# Patient Record
Sex: MALE | Race: WHITE | NOT HISPANIC OR LATINO | Employment: FULL TIME | URBAN - METROPOLITAN AREA
[De-identification: names, ages, dates, MRNs, and addresses within clinical notes are randomized per-mention and may not be internally consistent; named-entity substitution may affect disease eponyms.]

---

## 2017-04-30 ENCOUNTER — OFFICE VISIT (OUTPATIENT)
Dept: URGENT CARE | Age: 27
End: 2017-04-30
Payer: COMMERCIAL

## 2017-04-30 PROCEDURE — G0382 LEV 3 HOSP TYPE B ED VISIT: HCPCS | Performed by: FAMILY MEDICINE

## 2017-06-11 ENCOUNTER — OFFICE VISIT (OUTPATIENT)
Dept: URGENT CARE | Facility: MEDICAL CENTER | Age: 27
End: 2017-06-11
Payer: COMMERCIAL

## 2017-06-11 PROCEDURE — G0382 LEV 3 HOSP TYPE B ED VISIT: HCPCS

## 2017-07-14 ENCOUNTER — OFFICE VISIT (OUTPATIENT)
Dept: URGENT CARE | Facility: MEDICAL CENTER | Age: 27
End: 2017-07-14
Payer: COMMERCIAL

## 2017-07-14 PROCEDURE — G0383 LEV 4 HOSP TYPE B ED VISIT: HCPCS

## 2018-07-16 ENCOUNTER — TRANSCRIBE ORDERS (OUTPATIENT)
Dept: ADMINISTRATIVE | Facility: HOSPITAL | Age: 28
End: 2018-07-16

## 2018-07-16 DIAGNOSIS — R10.12 ABDOMINAL PAIN, LEFT UPPER QUADRANT: ICD-10-CM

## 2018-07-16 DIAGNOSIS — K21.9 GASTROESOPHAGEAL REFLUX DISEASE WITHOUT ESOPHAGITIS: Primary | ICD-10-CM

## 2018-07-18 DIAGNOSIS — K21.9 GASTROESOPHAGEAL REFLUX DISEASE WITHOUT ESOPHAGITIS: Primary | ICD-10-CM

## 2018-07-18 DIAGNOSIS — F90.9 ATTENTION DEFICIT HYPERACTIVITY DISORDER (ADHD), UNSPECIFIED ADHD TYPE: ICD-10-CM

## 2018-07-18 RX ORDER — DEXTROAMPHETAMINE SACCHARATE, AMPHETAMINE ASPARTATE MONOHYDRATE, DEXTROAMPHETAMINE SULFATE AND AMPHETAMINE SULFATE 5; 5; 5; 5 MG/1; MG/1; MG/1; MG/1
CAPSULE, EXTENDED RELEASE ORAL EVERY 24 HOURS
COMMUNITY
Start: 2018-06-12 | End: 2018-07-18 | Stop reason: SDUPTHER

## 2018-07-18 RX ORDER — PANTOPRAZOLE SODIUM 40 MG/1
TABLET, DELAYED RELEASE ORAL EVERY 12 HOURS
COMMUNITY
Start: 2018-05-04 | End: 2018-07-18 | Stop reason: SDUPTHER

## 2018-07-22 RX ORDER — PANTOPRAZOLE SODIUM 40 MG/1
40 TABLET, DELAYED RELEASE ORAL 2 TIMES DAILY
Qty: 60 TABLET | Refills: 1 | Status: SHIPPED | OUTPATIENT
Start: 2018-07-22

## 2018-07-22 RX ORDER — DEXTROAMPHETAMINE SACCHARATE, AMPHETAMINE ASPARTATE MONOHYDRATE, DEXTROAMPHETAMINE SULFATE AND AMPHETAMINE SULFATE 5; 5; 5; 5 MG/1; MG/1; MG/1; MG/1
20 CAPSULE, EXTENDED RELEASE ORAL EVERY 24 HOURS
Qty: 30 CAPSULE | Refills: 0 | Status: SHIPPED | OUTPATIENT
Start: 2018-07-22 | End: 2018-09-12 | Stop reason: SDUPTHER

## 2018-08-03 ENCOUNTER — HOSPITAL ENCOUNTER (OUTPATIENT)
Dept: GASTROENTEROLOGY | Facility: HOSPITAL | Age: 28
Discharge: HOME | End: 2018-08-03
Attending: INTERNAL MEDICINE
Payer: COMMERCIAL

## 2018-08-03 VITALS
SYSTOLIC BLOOD PRESSURE: 102 MMHG | HEIGHT: 70 IN | RESPIRATION RATE: 18 BRPM | HEART RATE: 51 BPM | WEIGHT: 190 LBS | DIASTOLIC BLOOD PRESSURE: 58 MMHG | BODY MASS INDEX: 27.2 KG/M2

## 2018-08-03 DIAGNOSIS — R10.12 ABDOMINAL PAIN, LEFT UPPER QUADRANT: ICD-10-CM

## 2018-08-03 DIAGNOSIS — K21.9 GASTROESOPHAGEAL REFLUX DISEASE WITHOUT ESOPHAGITIS: ICD-10-CM

## 2018-08-03 PROCEDURE — 91020 GASTRIC MOTILITY STUDIES: CPT

## 2018-08-03 NOTE — PERIOPERATIVE NURSING NOTE
Manometry catheter passed through right side  Pt tolerated 10 liquid and 10 viscous swallows  Catheter removed without difficulty  Pt left in NAD

## 2018-08-06 PROCEDURE — 91010 ESOPHAGUS MOTILITY STUDY: CPT | Performed by: INTERNAL MEDICINE

## 2018-09-12 DIAGNOSIS — F90.9 ATTENTION DEFICIT HYPERACTIVITY DISORDER (ADHD), UNSPECIFIED ADHD TYPE: ICD-10-CM

## 2018-09-12 RX ORDER — DEXTROAMPHETAMINE SACCHARATE, AMPHETAMINE ASPARTATE MONOHYDRATE, DEXTROAMPHETAMINE SULFATE AND AMPHETAMINE SULFATE 5; 5; 5; 5 MG/1; MG/1; MG/1; MG/1
20 CAPSULE, EXTENDED RELEASE ORAL EVERY 24 HOURS
Qty: 30 CAPSULE | Refills: 0 | Status: SHIPPED | OUTPATIENT
Start: 2018-09-12 | End: 2018-10-19 | Stop reason: SDUPTHER

## 2018-09-20 ENCOUNTER — TELEPHONE (OUTPATIENT)
Dept: FAMILY MEDICINE CLINIC | Facility: CLINIC | Age: 28
End: 2018-09-20

## 2018-10-19 ENCOUNTER — TELEPHONE (OUTPATIENT)
Dept: FAMILY MEDICINE CLINIC | Facility: CLINIC | Age: 28
End: 2018-10-19

## 2018-10-19 DIAGNOSIS — F90.9 ATTENTION DEFICIT HYPERACTIVITY DISORDER (ADHD), UNSPECIFIED ADHD TYPE: ICD-10-CM

## 2018-10-19 RX ORDER — DEXTROAMPHETAMINE SACCHARATE, AMPHETAMINE ASPARTATE MONOHYDRATE, DEXTROAMPHETAMINE SULFATE AND AMPHETAMINE SULFATE 5; 5; 5; 5 MG/1; MG/1; MG/1; MG/1
20 CAPSULE, EXTENDED RELEASE ORAL EVERY 24 HOURS
Qty: 30 CAPSULE | Refills: 0 | Status: SHIPPED | OUTPATIENT
Start: 2018-10-19 | End: 2018-11-23 | Stop reason: SDUPTHER

## 2018-11-23 ENCOUNTER — TELEPHONE (OUTPATIENT)
Dept: FAMILY MEDICINE CLINIC | Facility: CLINIC | Age: 28
End: 2018-11-23

## 2018-11-23 DIAGNOSIS — F90.9 ATTENTION DEFICIT HYPERACTIVITY DISORDER (ADHD), UNSPECIFIED ADHD TYPE: ICD-10-CM

## 2018-11-23 RX ORDER — DEXTROAMPHETAMINE SACCHARATE, AMPHETAMINE ASPARTATE MONOHYDRATE, DEXTROAMPHETAMINE SULFATE AND AMPHETAMINE SULFATE 5; 5; 5; 5 MG/1; MG/1; MG/1; MG/1
20 CAPSULE, EXTENDED RELEASE ORAL EVERY 24 HOURS
Qty: 30 CAPSULE | Refills: 0 | Status: SHIPPED | OUTPATIENT
Start: 2018-11-23 | End: 2019-03-05 | Stop reason: SDUPTHER

## 2019-01-13 ENCOUNTER — APPOINTMENT (EMERGENCY)
Dept: RADIOLOGY | Facility: HOSPITAL | Age: 29
End: 2019-01-13
Payer: COMMERCIAL

## 2019-01-13 ENCOUNTER — HOSPITAL ENCOUNTER (EMERGENCY)
Facility: HOSPITAL | Age: 29
Discharge: HOME | End: 2019-01-13
Attending: EMERGENCY MEDICINE | Admitting: EMERGENCY MEDICINE
Payer: COMMERCIAL

## 2019-01-13 VITALS
SYSTOLIC BLOOD PRESSURE: 139 MMHG | BODY MASS INDEX: 29.58 KG/M2 | HEART RATE: 72 BPM | RESPIRATION RATE: 18 BRPM | OXYGEN SATURATION: 98 % | WEIGHT: 206.13 LBS | DIASTOLIC BLOOD PRESSURE: 75 MMHG

## 2019-01-13 DIAGNOSIS — R07.9 CHEST PAIN: Primary | ICD-10-CM

## 2019-01-13 LAB
AMPHETAMINES SERPL QL SCN: NEGATIVE
ANION GAP SERPL CALCULATED.3IONS-SCNC: 4 MMOL/L (ref 4–13)
APAP SERPL-MCNC: <2 UG/ML (ref 10–30)
APTT PPP: 32 SECONDS (ref 26–38)
ATRIAL RATE: 56 BPM
BARBITURATES UR QL: NEGATIVE
BASOPHILS # BLD AUTO: 0.04 THOUSANDS/ΜL (ref 0–0.1)
BASOPHILS NFR BLD AUTO: 1 % (ref 0–1)
BENZODIAZ UR QL: NEGATIVE
BUN SERPL-MCNC: 12 MG/DL (ref 5–25)
CALCIUM SERPL-MCNC: 9 MG/DL (ref 8.3–10.1)
CHLORIDE SERPL-SCNC: 105 MMOL/L (ref 100–108)
CO2 SERPL-SCNC: 32 MMOL/L (ref 21–32)
COCAINE UR QL: NEGATIVE
CREAT SERPL-MCNC: 0.95 MG/DL (ref 0.6–1.3)
DEPRECATED D DIMER PPP: <270 NG/ML (FEU)
EOSINOPHIL # BLD AUTO: 0.07 THOUSAND/ΜL (ref 0–0.61)
EOSINOPHIL NFR BLD AUTO: 1 % (ref 0–6)
ERYTHROCYTE [DISTWIDTH] IN BLOOD BY AUTOMATED COUNT: 11.7 % (ref 11.6–15.1)
ETHANOL SERPL-MCNC: <3 MG/DL (ref 0–3)
GFR SERPL CREATININE-BSD FRML MDRD: 106 ML/MIN/1.73SQ M
GLUCOSE SERPL-MCNC: 96 MG/DL (ref 65–140)
HCT VFR BLD AUTO: 44.8 % (ref 36.5–49.3)
HGB BLD-MCNC: 15.2 G/DL (ref 12–17)
IMM GRANULOCYTES # BLD AUTO: 0.01 THOUSAND/UL (ref 0–0.2)
IMM GRANULOCYTES NFR BLD AUTO: 0 % (ref 0–2)
INR PPP: 1.08 (ref 0.86–1.17)
LYMPHOCYTES # BLD AUTO: 1.13 THOUSANDS/ΜL (ref 0.6–4.47)
LYMPHOCYTES NFR BLD AUTO: 19 % (ref 14–44)
MAGNESIUM SERPL-MCNC: 1.8 MG/DL (ref 1.6–2.6)
MCH RBC QN AUTO: 30.5 PG (ref 26.8–34.3)
MCHC RBC AUTO-ENTMCNC: 33.9 G/DL (ref 31.4–37.4)
MCV RBC AUTO: 90 FL (ref 82–98)
METHADONE UR QL: NEGATIVE
MONOCYTES # BLD AUTO: 0.42 THOUSAND/ΜL (ref 0.17–1.22)
MONOCYTES NFR BLD AUTO: 7 % (ref 4–12)
NEUTROPHILS # BLD AUTO: 4.32 THOUSANDS/ΜL (ref 1.85–7.62)
NEUTS SEG NFR BLD AUTO: 72 % (ref 43–75)
NRBC BLD AUTO-RTO: 0 /100 WBCS
OPIATES UR QL SCN: NEGATIVE
P AXIS: 81 DEGREES
PCP UR QL: NEGATIVE
PLATELET # BLD AUTO: 240 THOUSANDS/UL (ref 149–390)
PMV BLD AUTO: 12.1 FL (ref 8.9–12.7)
POTASSIUM SERPL-SCNC: 3.9 MMOL/L (ref 3.5–5.3)
PR INTERVAL: 160 MS
PROTHROMBIN TIME: 13.7 SECONDS (ref 11.8–14.2)
QRS AXIS: 77 DEGREES
QRSD INTERVAL: 78 MS
QT INTERVAL: 402 MS
QTC INTERVAL: 387 MS
RBC # BLD AUTO: 4.99 MILLION/UL (ref 3.88–5.62)
SALICYLATES SERPL-MCNC: <3 MG/DL (ref 3–20)
SODIUM SERPL-SCNC: 141 MMOL/L (ref 136–145)
T WAVE AXIS: 61 DEGREES
THC UR QL: NEGATIVE
TROPONIN I SERPL-MCNC: <0.02 NG/ML
TSH SERPL DL<=0.05 MIU/L-ACNC: 1.28 UIU/ML (ref 0.36–3.74)
VENTRICULAR RATE: 56 BPM
WBC # BLD AUTO: 5.99 THOUSAND/UL (ref 4.31–10.16)

## 2019-01-13 PROCEDURE — 83735 ASSAY OF MAGNESIUM: CPT | Performed by: PHYSICIAN ASSISTANT

## 2019-01-13 PROCEDURE — 99285 EMERGENCY DEPT VISIT HI MDM: CPT

## 2019-01-13 PROCEDURE — 85610 PROTHROMBIN TIME: CPT | Performed by: PHYSICIAN ASSISTANT

## 2019-01-13 PROCEDURE — 85025 COMPLETE CBC W/AUTO DIFF WBC: CPT | Performed by: PHYSICIAN ASSISTANT

## 2019-01-13 PROCEDURE — 80307 DRUG TEST PRSMV CHEM ANLYZR: CPT | Performed by: PHYSICIAN ASSISTANT

## 2019-01-13 PROCEDURE — 80329 ANALGESICS NON-OPIOID 1 OR 2: CPT | Performed by: PHYSICIAN ASSISTANT

## 2019-01-13 PROCEDURE — 85379 FIBRIN DEGRADATION QUANT: CPT | Performed by: PHYSICIAN ASSISTANT

## 2019-01-13 PROCEDURE — 93010 ELECTROCARDIOGRAM REPORT: CPT | Performed by: INTERNAL MEDICINE

## 2019-01-13 PROCEDURE — 80320 DRUG SCREEN QUANTALCOHOLS: CPT | Performed by: PHYSICIAN ASSISTANT

## 2019-01-13 PROCEDURE — 84484 ASSAY OF TROPONIN QUANT: CPT | Performed by: PHYSICIAN ASSISTANT

## 2019-01-13 PROCEDURE — 85730 THROMBOPLASTIN TIME PARTIAL: CPT | Performed by: PHYSICIAN ASSISTANT

## 2019-01-13 PROCEDURE — 80048 BASIC METABOLIC PNL TOTAL CA: CPT | Performed by: PHYSICIAN ASSISTANT

## 2019-01-13 PROCEDURE — 36415 COLL VENOUS BLD VENIPUNCTURE: CPT | Performed by: PHYSICIAN ASSISTANT

## 2019-01-13 PROCEDURE — 71046 X-RAY EXAM CHEST 2 VIEWS: CPT

## 2019-01-13 PROCEDURE — 84443 ASSAY THYROID STIM HORMONE: CPT | Performed by: PHYSICIAN ASSISTANT

## 2019-01-13 PROCEDURE — 93005 ELECTROCARDIOGRAM TRACING: CPT

## 2019-01-13 NOTE — DISCHARGE INSTRUCTIONS
Chest Pain   WHAT YOU NEED TO KNOW:   Chest pain can be caused by a range of conditions, from not serious to life-threatening  Chest pain can be a symptom of a digestive problem, such as acid reflux or a stomach ulcer  An anxiety attack or a strong emotion, such as anger, can also cause chest pain  Infection, inflammation, or a fracture in the bones or cartilage in your chest can cause pain or discomfort  Sometimes chest pain or pressure is caused by poor blood flow to your heart (angina)  Chest pain may also be caused by life-threatening conditions such as a heart attack or blood clot in your lungs  DISCHARGE INSTRUCTIONS:   Call 911 if:   · You have any of the following signs of a heart attack:      ¨ Squeezing, pressure, or pain in your chest that lasts longer than 5 minutes or returns    ¨ Discomfort or pain in your back, neck, jaw, stomach, or arm     ¨ Trouble breathing    ¨ Nausea or vomiting    ¨ Lightheadedness or a sudden cold sweat, especially with chest pain or trouble breathing    Return to the emergency department if:   · You have chest discomfort that gets worse, even with medicine  · You cough or vomit blood  · Your bowel movements are black or bloody  · You cannot stop vomiting, or it hurts to swallow  Contact your healthcare provider if:   · You have questions or concerns about your condition or care  Medicines:   · Medicines  may be given to treat the cause of your chest pain  Examples include pain medicine, anxiety medicine, or medicines to increase blood flow to your heart  · Do not take certain medicines without asking your healthcare provider first   These include NSAIDs, herbal or vitamin supplements, or hormones (estrogen or progestin)  · Take your medicine as directed  Contact your healthcare provider if you think your medicine is not helping or if you have side effects  Tell him or her if you are allergic to any medicine   Keep a list of the medicines, vitamins, and herbs you take  Include the amounts, and when and why you take them  Bring the list or the pill bottles to follow-up visits  Carry your medicine list with you in case of an emergency  Follow up with your healthcare provider within 72 hours, or as directed: You may need to return for more tests to find the cause of your chest pain  You may be referred to a specialist, such as a cardiologist or gastroenterologist  Write down your questions so you remember to ask them during your visits  Healthy living tips: The following are general healthy guidelines  If your chest pain is caused by a heart problem, your healthcare provider will give you specific guidelines to follow  · Do not smoke  Nicotine and other chemicals in cigarettes and cigars can cause lung and heart damage  Ask your healthcare provider for information if you currently smoke and need help to quit  E-cigarettes or smokeless tobacco still contain nicotine  Talk to your healthcare provider before you use these products  · Eat a variety of healthy, low-fat foods  Healthy foods include fruits, vegetables, whole-grain breads, low-fat dairy products, beans, lean meats, and fish  Ask for more information about a heart healthy diet  · Ask about activity  Your healthcare provider will tell you which activities to limit or avoid  Ask when you can drive, return to work, and have sex  Ask about the best exercise plan for you  · Maintain a healthy weight  Ask your healthcare provider how much you should weigh  Ask him or her to help you create a weight loss plan if you are overweight  · Get the flu and pneumonia vaccines  All adults should get the influenza (flu) vaccine  Get it every year as soon as it becomes available  The pneumococcal vaccine is given to adults aged 72 years or older  The vaccine is given every 5 years to prevent pneumococcal disease, such as pneumonia    © 2017 [employer] [employer] Information is for End User's use only and may not be sold, redistributed or otherwise used for commercial purposes  All illustrations and images included in CareNotes® are the copyrighted property of A D A M , Inc  or Malik Linda  The above information is an  only  It is not intended as medical advice for individual conditions or treatments  Talk to your doctor, nurse or pharmacist before following any medical regimen to see if it is safe and effective for you

## 2019-01-13 NOTE — ED PROVIDER NOTES
History  Chief Complaint   Patient presents with    Chest Pain     pt c/o chest pain and SOB at times  denies SOB now, is having sharp stabbing pain  Patient presents to emergency room complaining of a 1 week history of constant chest pressure  He states that he believes maybe it says secondary to  Lifting weights  He has had stress related to people stocking him from  [facility]  He is very anxious upon presentation  States the pain is worse with a deep breath  He complains of associated palpitations  He states he has recently had an upper respiratory infection that resolved  He states that he was at [facility] 2 days ago and had a full workup  There is nothing listed within the chart  CT head and EKG at that time that was normal   He denies any street drug use  He is not a smoker  He is not use alcohol products  He has rambling speech and does on and on and on about the stocking  He is quite anxious  He states that he has been drinking a lot of caffeinated sodas  He denies any cardiac history  Negative family history          History provided by:  Patient  Chest Pain   Pain location:  Substernal area  Pain radiates to:  Does not radiate  Pain radiates to the back: no    Pain severity:  Mild  Onset quality:  Gradual  Duration:  1 week  Timing:  Constant  Progression:  Unchanged  Chronicity:  New  Context: breathing, lifting, at rest and stress    Context: no drug use, not eating, no intercourse, no movement, not raising an arm and no trauma    Relieved by:  Nothing  Worsened by:  Nothing tried  Ineffective treatments:  None tried  Associated symptoms: anxiety and palpitations    Associated symptoms: no abdominal pain, no AICD problem, no altered mental status, no anorexia, no back pain, no claudication, no cough, no diaphoresis, no dizziness, no dysphagia, no fatigue, no fever, no headache, no heartburn, no lower extremity edema, no nausea, no near-syncope, no numbness, no orthopnea, no PND, no shortness of breath, not vomiting and no weakness    Risk factors: male sex    Risk factors: no aortic disease, no birth control, no coronary artery disease, no diabetes mellitus, no Josesito-Danlos syndrome, no high cholesterol, no hypertension, no immobilization, no Marfan's syndrome, not obese, not pregnant, no prior DVT/PE, no smoking and no surgery        Prior to Admission Medications   Prescriptions Last Dose Informant Patient Reported? Taking? amphetamine-dextroamphetamine (ADDERALL XR, 20MG,) 20 MG 24 hr capsule Not Taking at Unknown time  No No   Sig: Take 1 capsule (20 mg total) by mouth every 24 hours Max Daily Amount: 20 mg   Patient not taking: Reported on 1/13/2019    pantoprazole (PROTONIX) 40 mg tablet Not Taking at Unknown time  No No   Sig: Take 1 tablet (40 mg total) by mouth 2 (two) times a day   Patient not taking: Reported on 1/13/2019       Facility-Administered Medications: None       Past Medical History:   Diagnosis Date    ADHD     GERD (gastroesophageal reflux disease)        History reviewed  No pertinent surgical history  History reviewed  No pertinent family history  I have reviewed and agree with the history as documented  Social History   Substance Use Topics    Smoking status: Never Smoker    Smokeless tobacco: Never Used    Alcohol use No        Review of Systems   Constitutional: Positive for activity change  Negative for appetite change, chills, diaphoresis, fatigue and fever  HENT: Negative for congestion, dental problem, ear discharge, ear pain, postnasal drip, rhinorrhea, sore throat and trouble swallowing  Eyes: Negative for pain, discharge, redness and itching  Respiratory: Positive for chest tightness  Negative for cough and shortness of breath  Cardiovascular: Positive for chest pain and palpitations  Negative for orthopnea, claudication, PND and near-syncope     Gastrointestinal: Negative for abdominal pain, anorexia, diarrhea, heartburn, nausea and vomiting  Genitourinary: Negative for dysuria, frequency and urgency  Musculoskeletal: Negative for back pain, gait problem, neck pain and neck stiffness  Skin: Negative for color change, pallor and rash  Neurological: Negative for dizziness, weakness, numbness and headaches  Psychiatric/Behavioral: Negative for confusion  The patient is nervous/anxious  All other systems reviewed and are negative  Physical Exam  Physical Exam   Constitutional: He is oriented to person, place, and time  He appears well-developed and well-nourished  No distress  Very anxious, rambling speech   HENT:   Head: Normocephalic and atraumatic  Right Ear: External ear normal    Left Ear: External ear normal    Nose: Nose normal    Mouth/Throat: Oropharynx is clear and moist    Eyes: Conjunctivae are normal  Right eye exhibits no discharge  Left eye exhibits no discharge  Neck: Neck supple  No JVD present  No tracheal deviation present  No thyromegaly present  Cardiovascular: Normal rate, regular rhythm and normal heart sounds  Exam reveals no gallop and no friction rub  No murmur heard  Pulmonary/Chest: Effort normal and breath sounds normal  No stridor  No respiratory distress  He has no wheezes  He has no rales  Abdominal: Soft  Bowel sounds are normal  He exhibits no distension and no mass  There is no tenderness  There is no rebound and no guarding  Musculoskeletal: He exhibits no edema  Lymphadenopathy:     He has no cervical adenopathy  Neurological: He is alert and oriented to person, place, and time  Skin: Skin is warm  Capillary refill takes less than 2 seconds  He is not diaphoretic  Psychiatric: He has a normal mood and affect  His behavior is normal  Judgment and thought content normal    Nursing note and vitals reviewed        Vital Signs  ED Triage Vitals   Temp Pulse Respirations Blood Pressure SpO2   -- 01/13/19 1045 01/13/19 1048 01/13/19 1045 01/13/19 1045 70 18 129/73 98 %      Temp src Heart Rate Source Patient Position - Orthostatic VS BP Location FiO2 (%)   -- 01/13/19 1048 01/13/19 1048 01/13/19 1048 --    Monitor Sitting Right arm       Pain Score       --                  Vitals:    01/13/19 1045 01/13/19 1048 01/13/19 1300 01/13/19 1326   BP: 129/73 129/73  139/75   Pulse: 70 79 72 72   Patient Position - Orthostatic VS:  Sitting         Visual Acuity      ED Medications  Medications - No data to display    Diagnostic Studies  Results Reviewed     Procedure Component Value Units Date/Time    Rapid drug screen, urine [[id number]]  (Normal) Collected:  01/13/19 1225    Lab Status:  Final result Specimen:  Urine from Urine, Other Updated:  01/13/19 1244     Amph/Meth UR Negative     Barbiturate Ur Negative     Benzodiazepine Urine Negative     Cocaine Urine Negative     Methadone Urine Negative     Opiate Urine Negative     PCP Ur Negative     THC Urine Negative    Narrative:         FOR MEDICAL PURPOSES ONLY  IF CONFIRMATION NEEDED PLEASE CONTACT THE LAB WITHIN 5 DAYS  Drug Screen Cutoff Levels:  AMPHETAMINE/METHAMPHETAMINES  1000 ng/mL  BARBITURATES     200 ng/mL  BENZODIAZEPINES     200 ng/mL  COCAINE      300 ng/mL  METHADONE      300 ng/mL  OPIATES      300 ng/mL  PHENCYCLIDINE     25 ng/mL  THC       50 ng/mL    Basic metabolic panel [[id number]] Collected:  01/13/19 1137    Lab Status:  Final result Specimen:  Blood from Arm, Right Updated:  01/13/19 1218     Sodium 141 mmol/L      Potassium 3 9 mmol/L      Chloride 105 mmol/L      CO2 32 mmol/L      ANION GAP 4 mmol/L      BUN 12 mg/dL      Creatinine 0 95 mg/dL      Glucose 96 mg/dL      Calcium 9 0 mg/dL      eGFR 106 ml/min/1 73sq m     Narrative:         National Kidney Disease Education Program recommendations are as follows:  GFR calculation is accurate only with a steady state creatinine  Chronic Kidney disease less than 60 ml/min/1 73 sq  meters  Kidney failure less than 15 ml/min/1 73 sq  meters  TSH [[id number]]  (Normal) Collected:  01/13/19 1137    Lab Status:  Final result Specimen:  Blood from Arm, Right Updated:  01/13/19 1218     TSH 3RD GENERATON 1 284 uIU/mL     Narrative:         Patients undergoing fluorescein dye angiography may retain small amounts of fluorescein in the body for 48-72 hours post procedure  Samples containing fluorescein can produce falsely depressed TSH values  If the patient had this procedure,a specimen should be resubmitted post fluorescein clearance      Magnesium [[id number]]  (Normal) Collected:  01/13/19 1137    Lab Status:  Final result Specimen:  Blood from Arm, Right Updated:  01/13/19 1218     Magnesium 1 8 mg/dL     Troponin I [[id number]]  (Normal) Collected:  01/13/19 1137    Lab Status:  Final result Specimen:  Blood from Arm, Right Updated:  01/13/19 1217     Troponin I <0 02 ng/mL     Ethanol [[id number]]  (Normal) Collected:  01/13/19 1137    Lab Status:  Final result Specimen:  Blood from Arm, Right Updated:  01/13/19 1216     Ethanol Lvl <3 mg/dL     D-Dimer [[id number]]  (Normal) Collected:  01/13/19 1137    Lab Status:  Final result Specimen:  Blood from Arm, Right Updated:  01/13/19 1205     D-Dimer, Quant <270 ng/ml (FEU)     Salicylate level [[id number]]  (Abnormal) Collected:  01/13/19 1137    Lab Status:  Final result Specimen:  Blood from Arm, Right Updated:  64/35/99 0891     Salicylate Lvl <3 (L) mg/dL     Acetaminophen level [[id number]]  (Abnormal) Collected:  01/13/19 1137    Lab Status:  Final result Specimen:  Blood from Arm, Right Updated:  01/13/19 1204     Acetaminophen Level <2 (L) ug/mL     Protime-INR [[id number]]  (Normal) Collected:  01/13/19 1137    Lab Status:  Final result Specimen:  Blood from Arm, Right Updated:  01/13/19 1159     Protime 13 7 seconds      INR 1 08    APTT [[id number]]  (Normal) Collected:  01/13/19 1137    Lab Status:  Final result Specimen:  Blood from Arm, Right Updated:  01/13/19 1159     PTT 32 seconds CBC and differential [[id number]] Collected:  01/13/19 1137    Lab Status:  Final result Specimen:  Blood from Arm, Right Updated:  01/13/19 1149     WBC 5 99 Thousand/uL      RBC 4 99 Million/uL      Hemoglobin 15 2 g/dL      Hematocrit 44 8 %      MCV 90 fL      MCH 30 5 pg      MCHC 33 9 g/dL      RDW 11 7 %      MPV 12 1 fL      Platelets 971 Thousands/uL      nRBC 0 /100 WBCs      Neutrophils Relative 72 %      Immat GRANS % 0 %      Lymphocytes Relative 19 %      Monocytes Relative 7 %      Eosinophils Relative 1 %      Basophils Relative 1 %      Neutrophils Absolute 4 32 Thousands/µL      Immature Grans Absolute 0 01 Thousand/uL      Lymphocytes Absolute 1 13 Thousands/µL      Monocytes Absolute 0 42 Thousand/µL      Eosinophils Absolute 0 07 Thousand/µL      Basophils Absolute 0 04 Thousands/µL                  XR chest 2 views   ED Interpretation by Army Sal PA-C (01/13 2130)   No acute cardiopulmonary disease      Final Result by Dimas Jeffrey MD (01/13 9624)      No acute cardiopulmonary disease              Workstation performed: [id number]                    Procedures  ECG 12 Lead Documentation  Date/Time: 1/13/2019 11:16 AM  Performed by: Zaina Garcia  Authorized by: Zaina Garcia     Indications / Diagnosis:  Chest pain  ECG reviewed by me, the ED Provider: yes    Patient location:  ED  Previous ECG:     Previous ECG:  Unavailable    Comparison to cardiac monitor: Yes    Interpretation:     Interpretation: normal    Rate:     ECG rate:  56    ECG rate assessment: bradycardic    Rhythm:     Rhythm: sinus bradycardia    Ectopy:     Ectopy: none    QRS:     QRS axis:  Normal    QRS intervals:  Normal  Conduction:     Conduction: normal    ST segments:     ST segments:  Normal  T waves:     T waves: normal    Comments:      No signs of ischemia, no problem get a shin of the QT interval   Independently interpreted by me           Phone Contacts  ED Phone Contact    ED Course  ED Course as of Jan 13 1712   Chip Dear Jan 13, 2019   1310 I reassessed the patient  Patient has a very strange affect today  He answers questions appropriately  He is awake ,alert ,and oriented ,x3  He is aware that today is January 13th and it is his [place]  I asked him why he came all the way from [facility] and came to [facility] as he passed multiple hospitals mario route  "I don't like driving to the [facility]  Patient has a Psychologist that he sees for his paranoia  He has an appointment coming up soon  Plan is to DC patient to his home    F/U with his Psychologist           HEART Risk Score      Most Recent Value   History  0 Filed at: 01/13/2019 1315   ECG  0 Filed at: 01/13/2019 1315   Age  0 Filed at: 01/13/2019 1315   Risk Factors  0 Filed at: 01/13/2019 1315   Troponin  0 Filed at: 01/13/2019 1315   Heart Score Risk Calculator   History  0 Filed at: 01/13/2019 1315   ECG  0 Filed at: 01/13/2019 1315   Age  0 Filed at: 01/13/2019 1315   Risk Factors  0 Filed at: 01/13/2019 1315   Troponin  0 Filed at: 01/13/2019 1315   HEART Score  0 Filed at: 01/13/2019 1315   HEART Score  0 Filed at: 01/13/2019 1315            PERC Rule for PE      Most Recent Value   PERC Rule for PE   Age >=50  0 Filed at: 01/13/2019 1315   HR >=100  0 Filed at: 01/13/2019 1315   O2 Sat on room air < 95%  0 Filed at: 01/13/2019 1315   History of PE or DVT  0 Filed at: 01/13/2019 1315   Recent trauma or surgery  0 Filed at: 01/13/2019 1315   Hemoptysis  0 Filed at: 01/13/2019 1315   Exogenous estrogen  0 Filed at: 01/13/2019 1315   Unilateral leg swelling  0 Filed at: 01/13/2019 1315   PERC Rule for PE Results  0 Filed at: 01/13/2019 1315                Wells' Criteria for PE      Most Recent Value   Wells' Criteria for PE   Clinical signs and symptoms of DVT  0 Filed at: 01/13/2019 1315   PE is primary diagnosis or equally likely  0 Filed at: 01/13/2019 1315   HR >100  0 Filed at: 01/13/2019 1315   Immobilization at least 3 days or Surgery in the previous 4 weeks  0 Filed at: 01/13/2019 1315   Previous, objectively diagnosed PE or DVT  0 Filed at: 01/13/2019 1315   Hemoptysis  0 Filed at: 01/13/2019 1315   Malignancy with treatment within 6 months or palliative  0 Filed at: 01/13/2019 1315   Wells' Criteria Total  0 Filed at: 01/13/2019 1315            [facility]  Number of Diagnoses or Management Options  Chest pain: new and requires workup     Amount and/or Complexity of Data Reviewed  Clinical lab tests: ordered and reviewed  Tests in the radiology section of CPT®: ordered and reviewed  Tests in the medicine section of CPT®: ordered and reviewed    Risk of Complications, Morbidity, and/or Mortality  Presenting problems: high  Diagnostic procedures: high  Management options: high  General comments: Patient presents to emergency room with 1 week history of chest pain  He standing drainage  With sec complaining of paranoia and delutional thoughts regarding people stalking him  His affect was strange  A full workup was performed which was within normal limits  The patient was discharged home the was instructed to follow up with his psychologist   His laboratory tests did not demonstrate any signs of an acute cardiac event  His EKG demonstrated no ischemia  Patient Progress  Patient progress: stable    CritCare Time    Disposition  Final diagnoses:   Chest pain     Time reflects when diagnosis was documented in both MDM as applicable and the Disposition within this note     Time User Action Codes Description Comment    1/13/2019  1:58 PM Nikia Alvarez Add [R07 9] Chest pain       ED Disposition     ED Disposition Condition Comment    Discharge  Jovana Consuelokali discharge to home/self care      Condition at discharge: Good        Follow-up Information     Follow up With Specialties Details Why Gabe Borja MD Family Medicine In 2 days for a repeat blood pressure [address] [address]  [phone]      your Psychologist   as scheduled           Discharge Medication List as of 1/13/2019  1:17 PM      CONTINUE these medications which have NOT CHANGED    Details   amphetamine-dextroamphetamine (ADDERALL XR, 20MG,) 20 MG 24 hr capsule Take 1 capsule (20 mg total) by mouth every 24 hours Max Daily Amount: 20 mg, Starting Fri 11/23/2018, Normal      pantoprazole (PROTONIX) 40 mg tablet Take 1 tablet (40 mg total) by mouth 2 (two) times a day, Starting Sun 7/22/2018, Normal           No discharge procedures on file      ED Provider  Electronically Signed by           Bethany Scott PA-C  01/13/19 1225

## 2019-01-14 ENCOUNTER — TELEPHONE (OUTPATIENT)
Dept: FAMILY MEDICINE CLINIC | Facility: CLINIC | Age: 29
End: 2019-01-14

## 2019-01-15 ENCOUNTER — TELEPHONE (OUTPATIENT)
Dept: FAMILY MEDICINE CLINIC | Facility: CLINIC | Age: 29
End: 2019-01-15

## 2019-01-15 DIAGNOSIS — G47.00 INSOMNIA, UNSPECIFIED TYPE: Primary | ICD-10-CM

## 2019-01-15 RX ORDER — ZOLPIDEM TARTRATE 5 MG/1
TABLET ORAL
Qty: 30 TABLET | Refills: 0 | Status: SHIPPED | OUTPATIENT
Start: 2019-01-15 | End: 2019-09-30 | Stop reason: SDUPTHER

## 2019-01-15 RX ORDER — ZOLPIDEM TARTRATE 5 MG/1
TABLET ORAL
COMMUNITY
Start: 2018-05-12 | End: 2019-01-15 | Stop reason: SDUPTHER

## 2019-03-05 ENCOUNTER — TELEPHONE (OUTPATIENT)
Dept: FAMILY MEDICINE CLINIC | Facility: CLINIC | Age: 29
End: 2019-03-05

## 2019-03-05 DIAGNOSIS — F90.9 ATTENTION DEFICIT HYPERACTIVITY DISORDER (ADHD), UNSPECIFIED ADHD TYPE: ICD-10-CM

## 2019-03-06 RX ORDER — DEXTROAMPHETAMINE SACCHARATE, AMPHETAMINE ASPARTATE MONOHYDRATE, DEXTROAMPHETAMINE SULFATE AND AMPHETAMINE SULFATE 5; 5; 5; 5 MG/1; MG/1; MG/1; MG/1
20 CAPSULE, EXTENDED RELEASE ORAL EVERY 24 HOURS
Qty: 30 CAPSULE | Refills: 0 | Status: SHIPPED | OUTPATIENT
Start: 2019-03-06 | End: 2019-03-22 | Stop reason: SDUPTHER

## 2019-03-21 ENCOUNTER — TELEPHONE (OUTPATIENT)
Dept: FAMILY MEDICINE CLINIC | Facility: CLINIC | Age: 29
End: 2019-03-21

## 2019-03-21 NOTE — TELEPHONE ENCOUNTER
Per [facility] amphet/dextr cap 20mg ER in a plan exclusion and a prior auth can not be done      Please advise

## 2019-03-21 NOTE — TELEPHONE ENCOUNTER
Started verbal prior auth with [facility] (FG#[phone]) TTR#[id number]  For generic adderall  Brand covered at $50, pt can't afford brand   Turn around 3-5 days

## 2019-03-22 DIAGNOSIS — F90.9 ATTENTION DEFICIT HYPERACTIVITY DISORDER (ADHD), UNSPECIFIED ADHD TYPE: ICD-10-CM

## 2019-03-22 RX ORDER — DEXTROAMPHETAMINE SACCHARATE, AMPHETAMINE ASPARTATE MONOHYDRATE, DEXTROAMPHETAMINE SULFATE AND AMPHETAMINE SULFATE 5; 5; 5; 5 MG/1; MG/1; MG/1; MG/1
20 CAPSULE, EXTENDED RELEASE ORAL EVERY 24 HOURS
Qty: 30 CAPSULE | Refills: 0 | Status: SHIPPED | OUTPATIENT
Start: 2019-03-22 | End: 2019-04-11 | Stop reason: SDUPTHER

## 2019-03-25 NOTE — TELEPHONE ENCOUNTER
dextroamp-amphete er 20 mg is a plan exclusion and we can not do a prior auth  Is there another medication ?

## 2019-04-11 ENCOUNTER — OFFICE VISIT (OUTPATIENT)
Dept: FAMILY MEDICINE CLINIC | Facility: CLINIC | Age: 29
End: 2019-04-11
Payer: COMMERCIAL

## 2019-04-11 VITALS
DIASTOLIC BLOOD PRESSURE: 80 MMHG | HEART RATE: 84 BPM | BODY MASS INDEX: 28.72 KG/M2 | WEIGHT: 200.6 LBS | HEIGHT: 70 IN | RESPIRATION RATE: 16 BRPM | SYSTOLIC BLOOD PRESSURE: 114 MMHG | OXYGEN SATURATION: 98 % | TEMPERATURE: 98.4 F

## 2019-04-11 DIAGNOSIS — J01.00 ACUTE NON-RECURRENT MAXILLARY SINUSITIS: ICD-10-CM

## 2019-04-11 DIAGNOSIS — F90.9 ATTENTION DEFICIT HYPERACTIVITY DISORDER (ADHD), UNSPECIFIED ADHD TYPE: Primary | ICD-10-CM

## 2019-04-11 PROCEDURE — 99214 OFFICE O/P EST MOD 30 MIN: CPT | Performed by: FAMILY MEDICINE

## 2019-04-11 RX ORDER — AZITHROMYCIN 250 MG/1
TABLET, FILM COATED ORAL
Qty: 6 TABLET | Refills: 0 | Status: SHIPPED | OUTPATIENT
Start: 2019-04-11 | End: 2019-04-15

## 2019-04-11 RX ORDER — DEXTROAMPHETAMINE SACCHARATE, AMPHETAMINE ASPARTATE MONOHYDRATE, DEXTROAMPHETAMINE SULFATE AND AMPHETAMINE SULFATE 5; 5; 5; 5 MG/1; MG/1; MG/1; MG/1
20 CAPSULE, EXTENDED RELEASE ORAL EVERY 24 HOURS
Qty: 30 CAPSULE | Refills: 0 | Status: SHIPPED | OUTPATIENT
Start: 2019-04-11 | End: 2019-06-17 | Stop reason: SDUPTHER

## 2019-04-11 RX ORDER — FLUTICASONE PROPIONATE 50 MCG
2 SPRAY, SUSPENSION (ML) NASAL DAILY
Qty: 16 G | Refills: 0 | Status: SHIPPED | OUTPATIENT
Start: 2019-04-11

## 2019-04-15 PROBLEM — L25.5 CONTACT DERMATITIS DUE TO PLANT: Status: ACTIVE | Noted: 2017-04-30

## 2019-04-15 PROBLEM — J01.00 ACUTE NON-RECURRENT MAXILLARY SINUSITIS: Status: ACTIVE | Noted: 2019-04-15

## 2019-04-15 PROBLEM — F90.9 ATTENTION DEFICIT HYPERACTIVITY DISORDER (ADHD): Status: ACTIVE | Noted: 2019-04-15

## 2019-05-17 ENCOUNTER — OFFICE VISIT (OUTPATIENT)
Dept: FAMILY MEDICINE CLINIC | Facility: CLINIC | Age: 29
End: 2019-05-17
Payer: COMMERCIAL

## 2019-05-17 VITALS
BODY MASS INDEX: 28.67 KG/M2 | HEIGHT: 70 IN | OXYGEN SATURATION: 96 % | HEART RATE: 81 BPM | WEIGHT: 200.25 LBS | DIASTOLIC BLOOD PRESSURE: 82 MMHG | SYSTOLIC BLOOD PRESSURE: 124 MMHG | RESPIRATION RATE: 16 BRPM | TEMPERATURE: 98 F

## 2019-05-17 DIAGNOSIS — F90.9 ATTENTION DEFICIT HYPERACTIVITY DISORDER (ADHD), UNSPECIFIED ADHD TYPE: ICD-10-CM

## 2019-05-17 DIAGNOSIS — F41.9 ANXIETY: Primary | ICD-10-CM

## 2019-05-17 PROCEDURE — 99214 OFFICE O/P EST MOD 30 MIN: CPT | Performed by: FAMILY MEDICINE

## 2019-05-17 PROCEDURE — 3008F BODY MASS INDEX DOCD: CPT | Performed by: FAMILY MEDICINE

## 2019-05-17 PROCEDURE — 1036F TOBACCO NON-USER: CPT | Performed by: FAMILY MEDICINE

## 2019-05-17 RX ORDER — ESCITALOPRAM OXALATE 10 MG/1
10 TABLET ORAL DAILY
Qty: 30 TABLET | Refills: 0 | Status: SHIPPED | OUTPATIENT
Start: 2019-05-17

## 2019-06-17 DIAGNOSIS — F90.9 ATTENTION DEFICIT HYPERACTIVITY DISORDER (ADHD), UNSPECIFIED ADHD TYPE: ICD-10-CM

## 2019-06-17 RX ORDER — DEXTROAMPHETAMINE SACCHARATE, AMPHETAMINE ASPARTATE MONOHYDRATE, DEXTROAMPHETAMINE SULFATE AND AMPHETAMINE SULFATE 5; 5; 5; 5 MG/1; MG/1; MG/1; MG/1
20 CAPSULE, EXTENDED RELEASE ORAL EVERY 24 HOURS
Qty: 30 CAPSULE | Refills: 0 | Status: SHIPPED | OUTPATIENT
Start: 2019-06-17 | End: 2019-09-30 | Stop reason: SDUPTHER

## 2019-09-30 ENCOUNTER — OFFICE VISIT (OUTPATIENT)
Dept: FAMILY MEDICINE CLINIC | Facility: CLINIC | Age: 29
End: 2019-09-30
Payer: COMMERCIAL

## 2019-09-30 VITALS
BODY MASS INDEX: 29.38 KG/M2 | OXYGEN SATURATION: 97 % | TEMPERATURE: 98.1 F | SYSTOLIC BLOOD PRESSURE: 126 MMHG | HEIGHT: 70 IN | DIASTOLIC BLOOD PRESSURE: 80 MMHG | WEIGHT: 205.2 LBS | HEART RATE: 75 BPM

## 2019-09-30 DIAGNOSIS — F90.9 ATTENTION DEFICIT HYPERACTIVITY DISORDER (ADHD), UNSPECIFIED ADHD TYPE: Primary | ICD-10-CM

## 2019-09-30 DIAGNOSIS — F51.01 PRIMARY INSOMNIA: ICD-10-CM

## 2019-09-30 DIAGNOSIS — B07.0 PLANTAR WART: ICD-10-CM

## 2019-09-30 DIAGNOSIS — F41.9 ANXIETY: ICD-10-CM

## 2019-09-30 DIAGNOSIS — G47.00 INSOMNIA, UNSPECIFIED TYPE: ICD-10-CM

## 2019-09-30 PROCEDURE — 3008F BODY MASS INDEX DOCD: CPT | Performed by: FAMILY MEDICINE

## 2019-09-30 PROCEDURE — 99214 OFFICE O/P EST MOD 30 MIN: CPT | Performed by: FAMILY MEDICINE

## 2019-09-30 RX ORDER — DEXTROAMPHETAMINE SACCHARATE, AMPHETAMINE ASPARTATE MONOHYDRATE, DEXTROAMPHETAMINE SULFATE AND AMPHETAMINE SULFATE 5; 5; 5; 5 MG/1; MG/1; MG/1; MG/1
20 CAPSULE, EXTENDED RELEASE ORAL EVERY 24 HOURS
Qty: 30 CAPSULE | Refills: 0 | Status: SHIPPED | OUTPATIENT
Start: 2019-09-30

## 2019-09-30 NOTE — TELEPHONE ENCOUNTER
Pt seen today and requesting refill on Ambien  Please advise    PMED listed below     Report Prepared: 2019   Date Range: 2018 - 2019       Download PDF      Download CSV    FAVIAN COREA     Linked Records  Name  ID Gender Address   Chester Chu 1990 [address] male [address] [place] [address]   Report Criteria  First NamePETER  Last Recardo Curling  SDO85/  Summary      Summary  Total Prescriptions   1   Total Private Pay   0   Total Prescribers   1   Total Pharmacies   1    Opioids* (excluding buprenorphine)  Current Qty   0 0   Current MME/day   0 0   30 Day Avg MME/day   0 0    Buprenorphine*  Current Qty   0 0   Current mg/day   0 0   30 Day Avg mg/day   0 0    Prescriptions    Filled  ID  Written  Drug  QTY  Days  Prescriber  Rx #  Pharmacy *  Refills  Daily Dose  Pymt Type      2018  1  2018  DEXTROAMP-AMPHET ER 20 MG CAP  30 0  30  [facility]  [id number]  [facility] ([id number])  0   [place]    *Pharmacy is created using a combination of pharmacy name and the last four digits of the pharmacy license number

## 2019-09-30 NOTE — TELEPHONE ENCOUNTER
PT was in today and dr ordered him adderell but stated he was supposed to call him in zolpidem (AMBIEN) 5 mg tablet as well   Please confirm and send to [facility] in [place]

## 2019-09-30 NOTE — PROGRESS NOTES
Assessment/Plan:  Anxiety  No longer taking Lexapro  States he feels fine  We will continue to monitor  ADHD (attention deficit hyperactivity disorder)  Well controlled with Adderall  He was given a refill  Will continue to monitor  Insomnia  Stable with Ambien as needed  Continue same  Plantar wart  He was given Mediplast to use  He is to see podiatrist if that does not help  Melanie Rosey  Age: 30 Data as of: 09/30/2019   Demographics     Linked Records                Search Criteria               Summary     Summary   Total Prescriptions: 1   Total Prescribers: 1   Total Pharmacies: 1   Narcotics*     (excluding buprenorphine)  Current Qty: 0   Current MME/day: 0 00   30 Day Avg MME/day: 0 00   Buprenorphine*   Current Qty: 0   Current mg/day: 0 00   30 Day Avg mg/day: 0 00      Prescriptions     PRESCRIPTIONS  Total Prescriptions: 1   Total Private Pay: 0   Fill Date ID Written Drug Qty Days Prescriber Rx # Pharmacy Refill Daily Dose * Pymt Type    06/12/2018  1   06/12/2018  Dextroamp-Amphet Er 20 MG Cap  30 00 30 [place]  [id number]   Pen (0472)  0   [place]   *Per CDC guidance, the MME conversion factors prescribed or provided as part of the medication-assisted treatment for opioid use disorder should not be used to benchmark against dosage thresholds meant for opioids prescribed for pain  Buprenorphine products have no agreed upon morphine equivalency, and as partial opioid agonists, are not expected to be associated with overdose risk in the same dose-dependent manner as doses for full agonist opioids  MME = morphine milligram equivalents  LME = Lorazepam milligram equivalents  MG = dose in milligrams     PROVIDERS  Total Providers: 1   Name [facility] [place] Phone   [place] [address]    PHARMACIES  Total Pharmacies: 1   Name [place] Phone   [address]  [facility], [facility]  [phone] [facility] [phone] Diagnoses and all orders for this visit:    Attention deficit hyperactivity disorder (ADHD), unspecified ADHD type  -     amphetamine-dextroamphetamine (ADDERALL XR, 20MG,) 20 MG 24 hr capsule; Take 1 capsule (20 mg total) by mouth every 24 hoursMax Daily Amount: 20 mg    Anxiety    Primary insomnia    Plantar wart          There are no Patient Instructions on file for this visit  Return in about 3 months (around 12/30/2019)  Subjective:      Patient ID: Myles Moncada is a 30 y o  male  Chief Complaint   Patient presents with    Follow-up     ADHD    Cyst     rt leg       Adithya Ledesma is a 30 y o  M with multiple medical problems presenting to the office today for 4 month follow up for anxiety and to get growths on his R leg and on his L plantar foot evaluated  The growth on his R leg has been there for at least 10 years, and the warts have been there for at least 5 years  Neither of these areas are causing any pain  They do not appear infected  He is requesting refills on his Ambien and on his Adderall  He is no longer taking Lexapro  The following portions of the patient's history were reviewed and updated as appropriate: allergies, current medications, past family history, past medical history, past social history, past surgical history and problem list     Review of Systems   Constitutional: Negative for chills and fever  HENT: Negative for trouble swallowing  Eyes: Negative for visual disturbance  Respiratory: Negative for cough and shortness of breath  Cardiovascular: Negative for chest pain and palpitations  Gastrointestinal: Negative for abdominal pain, blood in stool and vomiting  Endocrine: Negative for cold intolerance and heat intolerance  Genitourinary: Negative for difficulty urinating and dysuria  Musculoskeletal: Negative for gait problem  Skin: Negative for rash  Neurological: Negative for dizziness, syncope and headaches     Hematological: Negative for adenopathy  Psychiatric/Behavioral: Negative for behavioral problems, confusion, decreased concentration, self-injury, sleep disturbance and suicidal ideas  The patient is not nervous/anxious  Current Outpatient Medications   Medication Sig Dispense Refill    amphetamine-dextroamphetamine (ADDERALL XR, 20MG,) 20 MG 24 hr capsule Take 1 capsule (20 mg total) by mouth every 24 hoursMax Daily Amount: 20 mg 30 capsule 0    zolpidem (AMBIEN) 5 mg tablet Take one at bedtime as needed  30 tablet 0    Cetirizine HCl (ZYRTEC ALLERGY) 10 MG CAPS Take 1 tablet by mouth daily      escitalopram (LEXAPRO) 10 mg tablet Take 1 tablet (10 mg total) by mouth daily (Patient not taking: Reported on 9/30/2019) 30 tablet 0    fluticasone (FLONASE) 50 mcg/act nasal spray 2 sprays into each nostril daily (Patient not taking: Reported on 9/30/2019) 16 g 0    pantoprazole (PROTONIX) 40 mg tablet Take 1 tablet (40 mg total) by mouth 2 (two) times a day (Patient not taking: Reported on 5/17/2019) 60 tablet 1     No current facility-administered medications for this visit  Objective:    /80 (BP Location: Left arm, Patient Position: Sitting, Cuff Size: Adult)   Pulse 75   Temp 98 1 °F (36 7 °C) (Tympanic)   Ht 5' 10" (1 778 m)   Wt 93 1 kg (205 lb 3 2 oz)   SpO2 97%   BMI 29 44 kg/m²        Physical Exam   Constitutional: He is oriented to person, place, and time  He appears well-developed and well-nourished  HENT:   Head: Normocephalic and atraumatic  Eyes: Pupils are equal, round, and reactive to light  EOM are normal    Neck: Normal range of motion  Neck supple  Cardiovascular: Normal rate, regular rhythm and normal heart sounds  Exam reveals no gallop and no friction rub  No murmur heard  Pulmonary/Chest: Effort normal and breath sounds normal  No stridor  He has no wheezes  He has no rales  Abdominal: Soft  Bowel sounds are normal  He exhibits no distension  There is no tenderness  Musculoskeletal: Normal range of motion  He exhibits no edema  Lymphadenopathy:     He has no cervical adenopathy  Neurological: He is alert and oriented to person, place, and time  No cranial nerve deficit  Skin: Skin is warm, dry and intact  Capillary refill takes less than 2 seconds  No rash noted  Psychiatric: He has a normal mood and affect  His behavior is normal    Nursing note and vitals reviewed               Rich Goodman MD

## 2019-10-01 RX ORDER — ZOLPIDEM TARTRATE 5 MG/1
TABLET ORAL
Qty: 30 TABLET | Refills: 0 | Status: SHIPPED | OUTPATIENT
Start: 2019-10-01

## 2020-03-27 ENCOUNTER — HOSPITAL ENCOUNTER (EMERGENCY)
Facility: HOSPITAL | Age: 30
Discharge: HOME | End: 2020-03-27
Attending: EMERGENCY MEDICINE
Payer: OTHER MISCELLANEOUS

## 2020-03-27 VITALS
RESPIRATION RATE: 18 BRPM | DIASTOLIC BLOOD PRESSURE: 91 MMHG | SYSTOLIC BLOOD PRESSURE: 137 MMHG | OXYGEN SATURATION: 100 % | HEART RATE: 64 BPM

## 2020-03-27 DIAGNOSIS — S01.81XA FACIAL LACERATION: Primary | ICD-10-CM

## 2020-03-27 PROCEDURE — 99284 EMERGENCY DEPT VISIT MOD MDM: CPT | Performed by: EMERGENCY MEDICINE

## 2020-03-27 PROCEDURE — 99282 EMERGENCY DEPT VISIT SF MDM: CPT

## 2020-03-27 RX ORDER — CEPHALEXIN 500 MG/1
500 CAPSULE ORAL 3 TIMES DAILY
Qty: 15 CAPSULE | Refills: 0 | Status: SHIPPED | OUTPATIENT
Start: 2020-03-27 | End: 2020-04-01

## 2020-03-27 RX ORDER — BACITRACIN, NEOMYCIN, POLYMYXIN B 400; 3.5; 5 [USP'U]/G; MG/G; [USP'U]/G
1 OINTMENT TOPICAL ONCE
Status: COMPLETED | OUTPATIENT
Start: 2020-03-27 | End: 2020-03-27

## 2020-03-27 RX ADMIN — BACITRACIN, NEOMYCIN, POLYMYXIN B 1 SMALL APPLICATION: 400; 3.5; 5 OINTMENT TOPICAL at 02:28

## 2020-03-27 NOTE — ED PROVIDER NOTES
History  Chief Complaint   Patient presents with    Facial Laceration     laceration to right cheek at work with sheet metal, tetanus UTD     25 yo male who presents with small L cheek facial laceration  Pt was at work tonight when piece of sheet metal hit his face causing small vertical 1cm laceration  Well approximated and no active bleeding  History provided by:  Patient   used: No    Laceration   Location:  Face  Facial laceration location:  L cheek  Length:  1cm  Depth:  Cutaneous  Quality: straight    Bleeding: controlled    Time since incident:  30 minutes  Laceration mechanism:  Metal edge  Pain details:     Quality:  Aching and dull    Severity:  Mild    Timing:  Constant    Progression:  Unchanged  Foreign body present:  No foreign bodies  Relieved by:  Nothing  Worsened by:  Pressure  Ineffective treatments:  None tried  Tetanus status:  Up to date  Associated symptoms: no fever and no rash        Prior to Admission Medications   Prescriptions Last Dose Informant Patient Reported? Taking? Cetirizine HCl (ZYRTEC ALLERGY) 10 MG CAPS  Self Yes No   Sig: Take 1 tablet by mouth daily   amphetamine-dextroamphetamine (ADDERALL XR, 20MG,) 20 MG 24 hr capsule   No No   Sig: Take 1 capsule (20 mg total) by mouth every 24 hoursMax Daily Amount: 20 mg   escitalopram (LEXAPRO) 10 mg tablet   No No   Sig: Take 1 tablet (10 mg total) by mouth daily   Patient not taking: Reported on 2019   fluticasone (FLONASE) 50 mcg/act nasal spray  Self No No   Si sprays into each nostril daily   Patient not taking: Reported on 2019   pantoprazole (PROTONIX) 40 mg tablet  Self No No   Sig: Take 1 tablet (40 mg total) by mouth 2 (two) times a day   Patient not taking: Reported on 2019   zolpidem (AMBIEN) 5 mg tablet   No No   Sig: Take one at bedtime as needed        Facility-Administered Medications: None       Past Medical History:   Diagnosis Date    ADHD     GERD (gastroesophageal reflux disease)        History reviewed  No pertinent surgical history  Family History   Problem Relation Age of Onset    No Known Problems Mother     No Known Problems Father      I have reviewed and agree with the history as documented  E-Cigarette/Vaping     E-Cigarette/Vaping Substances     Social History     Tobacco Use    Smoking status: Never Smoker    Smokeless tobacco: Never Used   Substance Use Topics    Alcohol use: No    Drug use: No       Review of Systems   Constitutional: Negative for chills and fever  HENT: Negative for congestion and sore throat  Eyes: Negative for visual disturbance  Respiratory: Negative for shortness of breath and wheezing  Cardiovascular: Negative for chest pain and palpitations  Gastrointestinal: Negative for abdominal pain, diarrhea, nausea and vomiting  Genitourinary: Negative for dysuria  Musculoskeletal: Negative for neck pain and neck stiffness  Skin: Positive for wound (linear vert lac L cheek - no bleeding)  Negative for pallor and rash  Neurological: Negative for headaches  Psychiatric/Behavioral: Negative for confusion  All other systems reviewed and are negative  Physical Exam  Physical Exam   Constitutional: He is oriented to person, place, and time  He appears well-developed and well-nourished  No distress  HENT:   Head: Normocephalic  Mouth/Throat: Oropharynx is clear and moist    linear vert 1cm lac L cheek - no bleeding, well approximated   Eyes: Pupils are equal, round, and reactive to light  EOM are normal    Neck: Normal range of motion  Neck supple  Cardiovascular: Normal rate and regular rhythm  No murmur heard  Pulmonary/Chest: Effort normal and breath sounds normal    Abdominal: Soft  Bowel sounds are normal  He exhibits no distension  There is no tenderness  Musculoskeletal: Normal range of motion  He exhibits no edema  Neurological: He is alert and oriented to person, place, and time     Skin: Skin is warm  No rash noted  No pallor  Psychiatric: He has a normal mood and affect  His behavior is normal    Nursing note and vitals reviewed  Vital Signs  ED Triage Vitals [03/27/20 0157]   Temp Pulse Respirations Blood Pressure SpO2   -- 64 18 137/91 100 %      Temp src Heart Rate Source Patient Position - Orthostatic VS BP Location FiO2 (%)   -- Monitor -- -- --      Pain Score       3           Vitals:    03/27/20 0157   BP: 137/91   Pulse: 64         Visual Acuity      ED Medications  Medications   neomycin-bacitracin-polymyxin b (NEOSPORIN) ointment 1 small application (1 small application Topical Given 3/27/20 0228)       Diagnostic Studies  Results Reviewed     None                 No orders to display              Procedures  Procedures         ED Course  ED Course as of Mar 27 0527   Fri Mar 27, 2020   0211 Pt seen and examined  25 yo male who presents with small L cheek facial laceration  Pt was at work tonight when piece of sheet metal hit his face causing small vertical 1cm laceration  Well approximated and no active bleeding  Tetanus UTD  Will cleanse wound and place abx ointment  Discussed need to keep wound clean and abx ointment during healing process  Offered to dermabond but pt refused  Very concerned about healing without scar  Discussed there will be a small scar regardless but will likely heal best without sutures  Will place on keflex due to lac being from sheet metal                                      MDM      Disposition  Final diagnoses:   Facial laceration     Time reflects when diagnosis was documented in both MDM as applicable and the Disposition within this note     Time User Action Codes Description Comment    3/27/2020  2:22 AM Davida Hunter 13 Facial laceration       ED Disposition     ED Disposition Condition Date/Time Comment    Discharge Stable Fri Mar 27, 2020  2:22 AM Shireen Walker discharge to home/self care              Follow-up Information    None Discharge Medication List as of 3/27/2020  2:23 AM      START taking these medications    Details   cephalexin (KEFLEX) 500 mg capsule Take 1 capsule (500 mg total) by mouth 3 (three) times a day for 5 days, Starting Fri 3/27/2020, Until Wed 4/1/2020, Print         CONTINUE these medications which have NOT CHANGED    Details   amphetamine-dextroamphetamine (ADDERALL XR, 20MG,) 20 MG 24 hr capsule Take 1 capsule (20 mg total) by mouth every 24 hoursMax Daily Amount: 20 mg, Starting Mon 9/30/2019, Normal      Cetirizine HCl (ZYRTEC ALLERGY) 10 MG CAPS Take 1 tablet by mouth daily, Starting Sun 4/30/2017, Historical Med      escitalopram (LEXAPRO) 10 mg tablet Take 1 tablet (10 mg total) by mouth daily, Starting Fri 5/17/2019, Normal      fluticasone (FLONASE) 50 mcg/act nasal spray 2 sprays into each nostril daily, Starting Thu 4/11/2019, Normal      pantoprazole (PROTONIX) 40 mg tablet Take 1 tablet (40 mg total) by mouth 2 (two) times a day, Starting Sun 7/22/2018, Normal      zolpidem (AMBIEN) 5 mg tablet Take one at bedtime as needed , Normal           No discharge procedures on file      PDMP Review       Value Time User    PDMP Reviewed  Yes 9/30/2019 12:12 PM Brenda Epstein MD          ED Provider  Electronically Signed by           Laney Morales DO  03/27/20 2208

## 2020-04-01 ENCOUNTER — HOSPITAL ENCOUNTER (EMERGENCY)
Facility: HOSPITAL | Age: 30
Discharge: HOME | End: 2020-04-01
Attending: EMERGENCY MEDICINE | Admitting: EMERGENCY MEDICINE
Payer: OTHER MISCELLANEOUS

## 2020-04-01 VITALS
TEMPERATURE: 98.4 F | OXYGEN SATURATION: 98 % | DIASTOLIC BLOOD PRESSURE: 76 MMHG | RESPIRATION RATE: 18 BRPM | SYSTOLIC BLOOD PRESSURE: 134 MMHG | HEART RATE: 68 BPM | WEIGHT: 160.94 LBS | BODY MASS INDEX: 23.09 KG/M2

## 2020-04-01 DIAGNOSIS — Z02.89 ENCOUNTER TO OBTAIN EXCUSE FROM WORK: Primary | ICD-10-CM

## 2020-04-01 PROCEDURE — 99281 EMR DPT VST MAYX REQ PHY/QHP: CPT

## 2020-04-01 PROCEDURE — 99282 EMERGENCY DEPT VISIT SF MDM: CPT | Performed by: EMERGENCY MEDICINE

## 2020-10-23 ENCOUNTER — TELEPHONE (OUTPATIENT)
Dept: FAMILY MEDICINE CLINIC | Facility: CLINIC | Age: 30
End: 2020-10-23

## 2021-09-08 PROBLEM — K21.9 GERD WITHOUT ESOPHAGITIS: Status: ACTIVE | Noted: 2021-09-08

## 2021-11-22 ENCOUNTER — OFFICE VISIT (OUTPATIENT)
Dept: FAMILY MEDICINE CLINIC | Facility: CLINIC | Age: 31
End: 2021-11-22
Payer: COMMERCIAL

## 2021-11-22 ENCOUNTER — TELEPHONE (OUTPATIENT)
Dept: PSYCHIATRY | Facility: CLINIC | Age: 31
End: 2021-11-22

## 2021-11-22 VITALS
RESPIRATION RATE: 16 BRPM | BODY MASS INDEX: 31.47 KG/M2 | DIASTOLIC BLOOD PRESSURE: 66 MMHG | OXYGEN SATURATION: 98 % | HEIGHT: 69 IN | HEART RATE: 60 BPM | SYSTOLIC BLOOD PRESSURE: 108 MMHG | WEIGHT: 212.5 LBS | TEMPERATURE: 97.7 F

## 2021-11-22 DIAGNOSIS — F90.8 ATTENTION DEFICIT HYPERACTIVITY DISORDER (ADHD), OTHER TYPE: ICD-10-CM

## 2021-11-22 DIAGNOSIS — Z00.00 HEALTHCARE MAINTENANCE: Primary | ICD-10-CM

## 2021-11-22 PROCEDURE — 99395 PREV VISIT EST AGE 18-39: CPT | Performed by: FAMILY MEDICINE

## 2021-11-22 RX ORDER — BENZOYL PEROXIDE 100 MG/ML
LIQUID TOPICAL
COMMUNITY
Start: 2021-08-18

## 2022-10-12 PROBLEM — Z00.00 HEALTHCARE MAINTENANCE: Status: RESOLVED | Noted: 2021-11-22 | Resolved: 2022-10-12

## 2023-01-23 ENCOUNTER — TELEPHONE (OUTPATIENT)
Dept: FAMILY MEDICINE CLINIC | Facility: CLINIC | Age: 33
End: 2023-01-23

## 2024-01-29 ENCOUNTER — OFFICE VISIT (OUTPATIENT)
Dept: GASTROENTEROLOGY | Facility: CLINIC | Age: 34
End: 2024-01-29
Payer: COMMERCIAL

## 2024-01-29 VITALS
TEMPERATURE: 97.7 F | BODY MASS INDEX: 31.84 KG/M2 | WEIGHT: 215 LBS | HEART RATE: 61 BPM | HEIGHT: 69 IN | SYSTOLIC BLOOD PRESSURE: 119 MMHG | DIASTOLIC BLOOD PRESSURE: 81 MMHG | OXYGEN SATURATION: 97 %

## 2024-01-29 DIAGNOSIS — R14.3 FLATULENCE SYMPTOM: ICD-10-CM

## 2024-01-29 DIAGNOSIS — K21.9 GASTROESOPHAGEAL REFLUX DISEASE, UNSPECIFIED WHETHER ESOPHAGITIS PRESENT: Primary | ICD-10-CM

## 2024-01-29 PROCEDURE — 99203 OFFICE O/P NEW LOW 30 MIN: CPT | Performed by: STUDENT IN AN ORGANIZED HEALTH CARE EDUCATION/TRAINING PROGRAM

## 2024-01-29 RX ORDER — PANTOPRAZOLE SODIUM 40 MG/1
40 TABLET, DELAYED RELEASE ORAL DAILY
Qty: 30 TABLET | Refills: 2 | Status: SHIPPED | OUTPATIENT
Start: 2024-01-29

## 2024-01-29 NOTE — PATIENT INSTRUCTIONS
Take pantoprazole 40 mg  Take this 30 minutes before your biggest meal of the day  GERD (Gastroesophageal Reflux Disease)   WHAT YOU NEED TO KNOW:   Gastroesophageal reflux disease (GERD) is reflux that happens more than 2 times a week for a few weeks. Reflux means acid and food in your stomach back up into your esophagus. GERD can cause other health problems over time if it is not treated.       DISCHARGE INSTRUCTIONS:   Call your local emergency number (911 in the [place]) if:   You have severe chest pain and sudden trouble breathing.      Return to the emergency department if:   You have trouble breathing after you vomit.    You have trouble swallowing, or pain with swallowing.    Your bowel movements are black, bloody, or tarry-looking.    Your vomit looks like coffee grounds or has blood in it.    Call your doctor or gastroenterologist if:   You feel full and cannot burp or vomit.    You vomit large amounts, or you vomit often.    You are losing weight without trying.    Your symptoms get worse or do not improve with treatment.    You have questions or concerns about your condition or care.    Medicines:   Medicines  are used to decrease stomach acid. Medicine may also be used to help your lower esophageal sphincter and stomach contract (tighten) more.    Take your medicine as directed.  Contact your healthcare provider if you think your medicine is not helping or if you have side effects. Tell your provider if you are allergic to any medicine. Keep a list of the medicines, vitamins, and herbs you take. Include the amounts, and when and why you take them. Bring the list or the pill bottles to follow-up visits. Carry your medicine list with you in case of an emergency.    Manage GERD:       Do not have foods or drinks that may increase heartburn.  These include chocolate, peppermint, fried or fatty foods, drinks that contain caffeine, or carbonated drinks (soda). Other foods include spicy foods, onions, tomatoes, and  tomato-based foods. Do not have foods or drinks that can irritate your esophagus, such as citrus fruits, juices, and alcohol.    Do not eat large meals.  When you eat a lot of food at one time, your stomach needs more acid to digest it. Eat 6 small meals each day instead of 3 large ones, and eat slowly. Do not eat meals 2 to 3 hours before bedtime.    Elevate the head of your bed.  Place 6-inch blocks under the head of your bed frame. You may also use more than one pillow under your head and shoulders while you sleep.    Maintain a healthy weight.  If you are overweight, weight loss may help relieve symptoms of GERD.    Do not smoke.  Smoking weakens the lower esophageal sphincter and increases the risk of GERD. Ask your healthcare provider for information if you currently smoke and need help to quit. E-cigarettes or smokeless tobacco still contain nicotine. Talk to your healthcare provider before you use these products.    Do not put pressure on your abdomen.  Pressure pushes acid up into your esophagus. Do not wear clothing that is tight around your waist. Do not bend over. Bend at the knees if you need to pick something up.  Follow up with your doctor or gastroenterologist as directed:  Write down your questions so you remember to ask them during your visits.  © Copyright [employer] 2023 Information is for End User's use only and may not be sold, redistributed or otherwise used for commercial purposes.  The above information is an  only. It is not intended as medical advice for individual conditions or treatments. Talk to your doctor, nurse or pharmacist before following any medical regimen to see if it is safe and effective for you.

## 2024-01-30 NOTE — PROGRESS NOTES
[facility]  Outpatient Consultation  Encounter: [id number]     PATIENT INFO     Name: Neptali Guerrero  YOB: 1990   Age: 34 y.o.   Sex: male   MRN: [id number]    ASSESSMENT & PLAN     Neptali Guerrero is a 34 y.o. male with GERD and flatulence.  GERD appears to be chronic and has been ongoing for several years, at least since 2017.  While structural abnormality such as hiatal hernia can certainly be causing his chronic symptoms, this was not seen on prior endoscopy.  There is certainly dietary and lifestyle factors contributing to his ongoing symptoms including inconsistency with medication, eating shortly before sleeping, consumption of trigger foods, and also eating while working which involves moving heavy equipment/objects.  Fortunately, he does not exhibit any alarm symptoms.  His complaint of flatulence appears to be a normal body habit as he denies it being excessive, significantly malodorous, or causing any other symptoms.  Flatulence may be due to dietary habits as well, possibly related to fatty foods and possibly consumption of carbonated beverages.  Again, he reports no alarming lower GI symptoms.    Counseled patient on dietary and lifestyle changes to minimize reflux including avoiding trigger foods (e.g. carbonated beverages, spicy and acidic foods, alcohol, chocolate, caffeine, etc.) and to avoid eating right before sleeping  Also advised him that eating while working (especially lifting heavy things) could exacerbate reflux symptoms as he is using core muscles and increasing intraabdominal pressure  Provided patient with educational info on GERD  Instructed patient to start taking pantoprazole regularly  Will send refill for pantoprazole 40 mg daily  Instructed him to take this 30 minutes before largest meal of the day  In regards to flatulence, I have advised him to follow dietary changes similar to that of GERD diet, and to try to follow a diet that is higher  "in protein, lower in fat and carbohydrates  He may continue to use OTC simethicone (Gas-X) and Beano  As he exhibits no alarm symptoms, there is no indication for urgent endoscopy or colonoscopy  However, I would consider upper endoscopy in 3 months for [facility]'s screening given his chronic GERD symptoms if his symptoms do not improve with above measures    1. Gastroesophageal reflux disease, unspecified whether esophagitis present    2. Flatulence symptom      No orders of the defined types were placed in this encounter.      FOLLOW-UP: 3 months    HISTORY OF PRESENT ILLNESS       Neptali Guerrero is a 34 y.o. male who presents to GI office for evaluation of GERD and flatulence.  Patient is new to this office.  He reports long history of reflux.  He has had symptoms for many years.  He had evaluation for reflux several years ago at outside hospital.  In 2017, he underwent EGD which was reportedly normal.  He also had manometry at that time which was reportedly normal.  He states he was prescribed pantoprazole but does not take this regularly.  He continues to experience reflux symptoms.  He does admit that his dietary habits are not great.  He reports eating shortly before sleeping.  He does consume beer as well.  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  He admits that he sometimes eats during work and experiences reflux at these times.  He denies any nausea, vomiting, dysphagia, or odynophagia.  He reports his aunt recently passed away from esophageal cancer.    He also reports flatulence.  The significance of this remains unclear as he denies it causing pain or discomfort, fecal incontinence or other symptoms.  He denies excessive flatulence.  He reports that flatus causes a \"pop\" noise and wakes him up from sleep.  He has tried OTC simethicone (Gas-X) and Beano.  He states he has tried watching his diet but admits he drank beer yesterday and ate fast food recently.  " There does not appear to be a clear association with dietary habits but details are unclear.  He denies any other alarming symptoms such as change in bowel habits, urgency, hematochezia, melena, diarrhea, constipation, unintentional weight loss.  He had colonoscopy reportedly in 2017 as well, which was normal.     ENDOSCOPIC HISTORY     UPPER ENDOSCOPY: 2017 at [facility]; report not available but reportedly normal    COLONOSCOPY: 2017 at [facility]; report not available but reportedly normal    REVIEW OF SYSTEMS     CONSTITUTIONAL: Denies any fever, chills, rigors, and weight loss  HEENT: No earache or tinnitus, denies hearing loss or visual disturbances  CARDIOVASCULAR: No chest pain or palpitations  RESPIRATORY: Denies any cough, hemoptysis, shortness of breath or dyspnea on exertion  GASTROINTESTINAL: As noted in the History of Present Illness  GENITOURINARY: No problems with urination, denies any hematuria or dysuria  NEUROLOGIC: No dizziness or vertigo, denies headaches   MUSCULOSKELETAL: Denies any muscle or joint pain   SKIN: Denies skin rashes or itching  ENDOCRINE: Denies excessive thirst, denies intolerance to heat or cold  PSYCHOSOCIAL: Denies depression or anxiety, denies any recent memory loss     Historical Information   Past Medical History:   Diagnosis Date    ADHD     GERD (gastroesophageal reflux disease)      Past Surgical History:   Procedure Laterality Date    COLONOSCOPY W/ BIOPSIES  03/08/2017    DR VAZQUEZ / NORMAL    UPPER GASTROINTESTINAL ENDOSCOPY  02/08/2017    DR VAZQUEZ/ NORMAL     Social History   Social History     Substance and Sexual Activity   Alcohol Use Yes    Alcohol/week: 2.0 standard drinks of alcohol    Types: 2 Standard drinks or equivalent per week     Social History     Substance and Sexual Activity   Drug Use No     Social History     Tobacco Use   Smoking Status Never   Smokeless Tobacco Never     Family History   Problem Relation Age of Onset    No Known Problems Mother     No  "Known Problems Father        MEDICATIONS & ALLERGIES     Current Outpatient Medications   Medication Instructions    amphetamine-dextroamphetamine (ADDERALL XR, 20MG,) 20 MG 24 hr capsule 20 mg, Oral, Every 24 hours    benzoyl peroxide 10 % external wash APPLY TO FACE DAILY AND LATHER, THEN RINSE OFF    Cetirizine HCl (ZYRTEC ALLERGY) 10 MG CAPS 1 tablet, Daily    escitalopram (LEXAPRO) 10 mg, Oral, Daily    fluticasone (FLONASE) 50 mcg/act nasal spray 2 sprays, Nasal, Daily    pantoprazole (PROTONIX) 40 mg, Oral, Daily, Take 30 minutes before largest meal    zolpidem (AMBIEN) 5 mg tablet Take one at bedtime as needed.     No Known Allergies    PHYSICAL EXAM      Objective   Blood pressure 119/81, pulse 61, temperature 97.7 °F (36.5 °C), temperature source Tympanic, height 5' 9\" (1.753 m), weight 97.5 kg (215 lb), SpO2 97%. Body mass index is 31.75 kg/m².    General Appearance:   Alert, cooperative, no distress, flattened affect, slow speech but coherent   HEENT:   Normocephalic, atraumatic, anicteric     Neck:   Supple, symmetrical, trachea midline   Lungs:   Equal chest rise, respirations unlabored    Heart:   Regular rate and rhythm   Abdomen:   Soft, non-tender, non-distended; normal bowel sounds; no masses, no organomegaly    Rectal:   Deferred    Extremities:   No cyanosis, clubbing or edema    Neuro:   Moves all 4 extremities    Skin:   No jaundice, rashes, or lesions       LABORATORY RESULTS     No visits with results within 1 Day(s) from this visit.   Latest known visit with results is:   Admission on 01/13/2019, Discharged on 01/13/2019   Component Date Value    WBC 01/13/2019 5.99     RBC 01/13/2019 4.99     Hemoglobin 01/13/2019 15.2     Hematocrit 01/13/2019 44.8     MCV 01/13/2019 90     MCH 01/13/2019 30.5     MCHC 01/13/2019 33.9     RDW 01/13/2019 11.7     MPV 01/13/2019 12.1     Platelets 01/13/2019 240     nRBC 01/13/2019 0     Neutrophils Relative 01/13/2019 72     Immat GRANS % 01/13/2019 0     " Lymphocytes Relative 01/13/2019 19     Monocytes Relative 01/13/2019 7     Eosinophils Relative 01/13/2019 1     Basophils Relative 01/13/2019 1     Neutrophils Absolute 01/13/2019 4.32     Immature Grans Absolute 01/13/2019 0.01     Lymphocytes Absolute 01/13/2019 1.13     Monocytes Absolute 01/13/2019 0.42     Eosinophils Absolute 01/13/2019 0.07     Basophils Absolute 01/13/2019 0.04     Protime 01/13/2019 13.7     INR 01/13/2019 1.08     PTT 01/13/2019 32     Sodium 01/13/2019 141     Potassium 01/13/2019 3.9     Chloride 01/13/2019 105     CO2 01/13/2019 32     ANION GAP 01/13/2019 4     BUN 01/13/2019 12     Creatinine 01/13/2019 0.95     Glucose 01/13/2019 96     Calcium 01/13/2019 9.0     eGFR 01/13/2019 106     TSH 3RD GENERATON 01/13/2019 1.284     Magnesium 01/13/2019 1.8     Troponin I 01/13/2019 <0.02     D-Dimer, Quant 01/13/2019 <270     Amph/Meth UR 01/13/2019 Negative     Barbiturate Ur 01/13/2019 Negative     Benzodiazepine Urine 01/13/2019 Negative     Cocaine Urine 01/13/2019 Negative     Methadone Urine 01/13/2019 Negative     Opiate Urine 01/13/2019 Negative     PCP Ur 01/13/2019 Negative     THC Urine 01/13/2019 Negative     Ethanol Lvl 01/13/2019 <3     Salicylate Lvl 01/13/2019 <3 (L)     Acetaminophen Level 01/13/2019 <2 (L)     Ventricular Rate 01/13/2019 56     Atrial Rate 01/13/2019 56     NC Interval 01/13/2019 160     QRSD Interval 01/13/2019 78     QT Interval 01/13/2019 402     QTC Interval 01/13/2019 387     P [place] 01/13/2019 81     QRS [place] 01/13/2019 77     T Wave [place] 01/13/2019 61         IMAGING RESULTS     No results found.  I have personally reviewed any available and pertinent imaging study reports.      Moises Dewitt D.O.  [facility]  Division of Gastroenterology & Hepatology  Available on [employer]  [email or web address]    ** Please Note: This note is constructed using a voice recognition dictation system. **

## 2024-02-21 PROBLEM — J01.00 ACUTE NON-RECURRENT MAXILLARY SINUSITIS: Status: RESOLVED | Noted: 2019-04-15 | Resolved: 2024-02-21

## 2024-06-28 ENCOUNTER — TELEPHONE (OUTPATIENT)
Dept: OTHER | Facility: OTHER | Age: 34
End: 2024-06-28

## 2024-06-28 NOTE — TELEPHONE ENCOUNTER
Pt has moved and is requesting to establish with the [place] office    Please call back [phone]

## 2024-07-02 ENCOUNTER — TELEPHONE (OUTPATIENT)
Dept: PSYCHIATRY | Facility: CLINIC | Age: 34
End: 2024-07-02

## 2024-07-02 DIAGNOSIS — F41.9 ANXIETY: ICD-10-CM

## 2024-07-02 DIAGNOSIS — G47.00 INSOMNIA, UNSPECIFIED TYPE: ICD-10-CM

## 2024-07-02 DIAGNOSIS — F90.9 ATTENTION DEFICIT HYPERACTIVITY DISORDER (ADHD), UNSPECIFIED ADHD TYPE: ICD-10-CM

## 2024-07-02 RX ORDER — ZOLPIDEM TARTRATE 5 MG/1
TABLET ORAL
Qty: 30 TABLET | Refills: 0 | Status: CANCELLED | OUTPATIENT
Start: 2024-07-02

## 2024-07-02 RX ORDER — DEXTROAMPHETAMINE SACCHARATE, AMPHETAMINE ASPARTATE MONOHYDRATE, DEXTROAMPHETAMINE SULFATE AND AMPHETAMINE SULFATE 5; 5; 5; 5 MG/1; MG/1; MG/1; MG/1
20 CAPSULE, EXTENDED RELEASE ORAL EVERY 24 HOURS
Qty: 30 CAPSULE | Refills: 0 | Status: CANCELLED | OUTPATIENT
Start: 2024-07-02

## 2024-07-02 RX ORDER — ESCITALOPRAM OXALATE 10 MG/1
10 TABLET ORAL DAILY
Qty: 30 TABLET | Refills: 0 | Status: CANCELLED | OUTPATIENT
Start: 2024-07-02

## 2024-07-02 NOTE — TELEPHONE ENCOUNTER
Received message stating that Pt is interested in establishing care with the [place] office.   Writer called Pt to verify needs of services and inform of current wait list. No answer, lvm for pt to call back the intake dept at [phone].

## 2024-07-03 NOTE — TELEPHONE ENCOUNTER
Pt has not been seen since 2021, I sent [employer] message stating he needs to be seen for mediaction refills.   Medication removed from encounter       Refills have been requested for the following medications:         escitalopram (LEXAPRO) 10 mg tablet [Hodan Bronson MD]         amphetamine-dextroamphetamine (ADDERALL XR, 20MG,) 20 MG 24 hr capsule [Hodan Bronson MD]         zolpidem (AMBIEN) 5 mg tablet [Hodan Bronson MD]     Preferred pharmacy: [id number] [address]

## 2024-12-06 ENCOUNTER — EVALUATION (OUTPATIENT)
Dept: PHYSICAL THERAPY | Facility: CLINIC | Age: 34
End: 2024-12-06
Payer: COMMERCIAL

## 2024-12-06 DIAGNOSIS — M19.90 GENERALIZED ARTHRITIS: ICD-10-CM

## 2024-12-06 DIAGNOSIS — M53.84 THORACIC SPINE DYSFUNCTION: Primary | ICD-10-CM

## 2024-12-06 PROCEDURE — 97161 PT EVAL LOW COMPLEX 20 MIN: CPT | Performed by: PHYSICAL THERAPIST

## 2024-12-06 PROCEDURE — 97110 THERAPEUTIC EXERCISES: CPT | Performed by: PHYSICAL THERAPIST

## 2024-12-06 PROCEDURE — 97535 SELF CARE MNGMENT TRAINING: CPT | Performed by: PHYSICAL THERAPIST

## 2024-12-06 NOTE — LETTER
2024    Sakshi Lee PA-C  [address] [facility] [address]    Patient: Neptali Guerrero   YOB: 1990   Date of Visit: 2024     Encounter Diagnosis     ICD-10-CM    1. Thoracic spine dysfunction  M53.84       2. Generalized arthritis  M19.90           Dear Dr. Lee:    Thank you for your recent referral of Neptali Guerrero. Please review the attached evaluation summary from Neptali's recent visit.     Please verify that you agree with the plan of care by signing the attached order.     If you have any questions or concerns, please do not hesitate to call.     I sincerely appreciate the opportunity to share in the care of one of your patients and hope to have another opportunity to work with you in the near future.       Sincerely,    Felix Powell, PT, DPT, ATC, ART      Referring Provider:      I certify that I have read the below Plan of Care and certify the need for these services furnished under this plan of treatment while under my care.                    Sakshi Lee PA-C  [address] [facility] [address]  Via Fax: [phone]          PT Evaluation  and PT Discharge    Today's date: 2024  Patient name: Neptali Guerrero  : 1990  MRN: [id number]  Referring provider: Sakshi Lee PA-C  Dx:   Encounter Diagnosis     ICD-10-CM    1. Thoracic spine dysfunction  M53.84       2. Generalized arthritis  M19.90                      Assessment  Understanding of Dx/Px/POC: good     Prognosis: good    Goals  STGs: To be complete within 4 weeks  - Decrease pain to < 2/10 at worst  - Increase cervical and thoracic ROM to WNL  - postural and cervical strength to > 4+/5  - Improve postural awareness capacity to > 60min before deficit     LTGs: To be complete within 6 weeks  - Able to sit for any extended amount of time without pain or limitation for increased safety and functional capacity with ADLs and work-related duty  - Able to read in a cervical flexed position for any  extended amount of time without pain or limitation for increased safety and functional capacity with ADLs and and work-related duty  - Able to complete all lifting/carrying activity without pain or limitation for increased safety and functional capacity with ADLs and work-related duty  - Able to complete transfers repetitively without pain or limitation for increased safety and functional capacity with ADLs and work-related duty    Plan    Planned therapy interventions: manual therapy, neuromuscular re-education, patient/caregiver education, postural training, self care, therapeutic activities, therapeutic exercise and home exercise program    Frequency: 1x week  Duration in weeks: 1       Pt is a 34 y.o. male with chronic Mid back pain and joint pain t/o entire body who presents with functional deficits including decreased capacity with sitting, lifting/carrying, transfers, and bending over at the trunk. Upon completion of today's initial evaluation, Neptali's sx remain consistent with Thoracic facet joint, muscular, and postural dysfunction, as well as generalized muscle weakness and unknown reasoning-joint pain and clicking t/o entire body. Pt will benefit from skilled physical therapy to address current deficits. Pt opting to D/C to HEP after today's session due to cost of attendance.        Subjective Evaluation    Patient Goals  Patient goals for therapy: increased strength, decreased pain and increased motion    Pain  Current pain ratin  At best pain ratin  At worst pain ratin  Location: Thoracic, as well as joint pain t/o body         Pt reports he has been dealing with chronic Mid-back pain and and generalized joint pain t/o body. Pt reports sx have continued negatively impact his overall safety and functional capacity with ADLs and work-related duty.        Objective Pain level ranges 5-8/10  AROM: Cervical and Thoracic 50% decreased extension and bilateral rotation; WFL t/o all remaining  joints  Strength: Cervical and postural 4/5; Core stability 4/5; Bilateral UE and LE strength 4+/5 t/o  Postural Awareness: Poor (rounded shoulders, forward head)  Unable sit without pain and limitation  Unable to rotate trunk R or L without pain and limitation  Unable to complete lifting/carrying activity without pain and limitation  Unable to complete transfers without pain and limitation              Precautions: None at this time    Daily Treatment Diary    HEP: Handout provided and discussed      Manuals 12/6/24       ART        PROM/Stretch        IASTM        STM/Triggerpoint        JM                Neuro Re-Ed        Prone Scap retract 2x10       No $ AROM 2x10                                               Ther Ex        No $ TB 2x10 L3       Towel neck ext 2x10       Neck retract 2x10       On Elbows, fists under chin neck ext 2x10                                               Ther Activity                        Gait Training                        Modalities        MHP        CP x10'       US/Stim

## 2024-12-06 NOTE — PROGRESS NOTES
PT Evaluation  and PT Discharge    Today's date: 2024  Patient name: Neptali Guerrero  : 1990  MRN: [id number]  Referring provider: Sakshi Lee PA-C  Dx:   Encounter Diagnosis     ICD-10-CM    1. Thoracic spine dysfunction  M53.84       2. Generalized arthritis  M19.90                      Assessment  Understanding of Dx/Px/POC: good     Prognosis: good    Goals  STGs: To be complete within 4 weeks  - Decrease pain to < 2/10 at worst  - Increase cervical and thoracic ROM to WNL  - postural and cervical strength to > 4+/5  - Improve postural awareness capacity to > 60min before deficit     LTGs: To be complete within 6 weeks  - Able to sit for any extended amount of time without pain or limitation for increased safety and functional capacity with ADLs and work-related duty  - Able to read in a cervical flexed position for any extended amount of time without pain or limitation for increased safety and functional capacity with ADLs and and work-related duty  - Able to complete all lifting/carrying activity without pain or limitation for increased safety and functional capacity with ADLs and work-related duty  - Able to complete transfers repetitively without pain or limitation for increased safety and functional capacity with ADLs and work-related duty    Plan    Planned therapy interventions: manual therapy, neuromuscular re-education, patient/caregiver education, postural training, self care, therapeutic activities, therapeutic exercise and home exercise program    Frequency: 1x week  Duration in weeks: 1       Pt is a 34 y.o. male with chronic Mid back pain and joint pain t/o entire body who presents with functional deficits including decreased capacity with sitting, lifting/carrying, transfers, and bending over at the trunk. Upon completion of today's initial evaluation, Neptali's sx remain consistent with Thoracic facet joint, muscular, and postural dysfunction, as well as generalized muscle weakness  and unknown reasoning-joint pain and clicking t/o entire body. Pt will benefit from skilled physical therapy to address current deficits. Pt opting to D/C to HEP after today's session due to cost of attendance.        Subjective Evaluation    Patient Goals  Patient goals for therapy: increased strength, decreased pain and increased motion    Pain  Current pain ratin  At best pain ratin  At worst pain ratin  Location: Thoracic, as well as joint pain t/o body         Pt reports he has been dealing with chronic Mid-back pain and and generalized joint pain t/o body. Pt reports sx have continued negatively impact his overall safety and functional capacity with ADLs and work-related duty.        Objective Pain level ranges 5-8/10  AROM: Cervical and Thoracic 50% decreased extension and bilateral rotation; WFL t/o all remaining joints  Strength: Cervical and postural 4/5; Core stability 4/5; Bilateral UE and LE strength 4+/5 t/o  Postural Awareness: Poor (rounded shoulders, forward head)  Unable sit without pain and limitation  Unable to rotate trunk R or L without pain and limitation  Unable to complete lifting/carrying activity without pain and limitation  Unable to complete transfers without pain and limitation              Precautions: None at this time    Daily Treatment Diary    HEP: Handout provided and discussed      Manuals 24       ART        PROM/Stretch        IASTM        STM/Triggerpoint        JM                Neuro Re-Ed        Prone Scap retract 2x10       No $ AROM 2x10                                               Ther Ex        No $ TB 2x10 L3       Towel neck ext 2x10       Neck retract 2x10       On Elbows, fists under chin neck ext 2x10                                               Ther Activity                        Gait Training                        Modalities        MHP        CP x10'       US/Stim

## 2025-08-06 ENCOUNTER — APPOINTMENT (OUTPATIENT)
Dept: URGENT CARE | Facility: MEDICAL CENTER | Age: 35
End: 2025-08-06